# Patient Record
Sex: MALE | Race: WHITE | Employment: OTHER | URBAN - METROPOLITAN AREA
[De-identification: names, ages, dates, MRNs, and addresses within clinical notes are randomized per-mention and may not be internally consistent; named-entity substitution may affect disease eponyms.]

---

## 2017-06-01 ENCOUNTER — HOSPITAL ENCOUNTER (EMERGENCY)
Age: 52
Discharge: HOME | End: 2017-06-02
Attending: EMERGENCY MEDICINE
Payer: OTHER GOVERNMENT

## 2017-06-01 ENCOUNTER — APPOINTMENT (OUTPATIENT)
Dept: GENERAL RADIOLOGY | Age: 52
End: 2017-06-01
Attending: EMERGENCY MEDICINE
Payer: OTHER GOVERNMENT

## 2017-06-01 ENCOUNTER — APPOINTMENT (OUTPATIENT)
Dept: CT IMAGING | Age: 52
End: 2017-06-01
Attending: EMERGENCY MEDICINE
Payer: OTHER GOVERNMENT

## 2017-06-01 VITALS
RESPIRATION RATE: 19 BRPM | BODY MASS INDEX: 39.78 KG/M2 | DIASTOLIC BLOOD PRESSURE: 60 MMHG | OXYGEN SATURATION: 100 % | HEART RATE: 77 BPM | HEIGHT: 74 IN | SYSTOLIC BLOOD PRESSURE: 123 MMHG | WEIGHT: 310 LBS | TEMPERATURE: 98.3 F

## 2017-06-01 DIAGNOSIS — R55 VASOVAGAL REACTION: Primary | ICD-10-CM

## 2017-06-01 LAB
ANION GAP BLD CALC-SCNC: 13 MMOL/L (ref 3–18)
BASOPHILS # BLD AUTO: 0 K/UL (ref 0–0.06)
BASOPHILS # BLD: 0 % (ref 0–2)
BUN SERPL-MCNC: 17 MG/DL (ref 7–18)
BUN/CREAT SERPL: 16 (ref 12–20)
CALCIUM SERPL-MCNC: 8.9 MG/DL (ref 8.5–10.1)
CHLORIDE SERPL-SCNC: 105 MMOL/L (ref 100–108)
CK MB CFR SERPL CALC: NORMAL % (ref 0–4)
CK MB SERPL-MCNC: <1 NG/ML (ref 5–25)
CK SERPL-CCNC: 184 U/L (ref 39–308)
CO2 SERPL-SCNC: 24 MMOL/L (ref 21–32)
CREAT SERPL-MCNC: 1.07 MG/DL (ref 0.6–1.3)
DIFFERENTIAL METHOD BLD: NORMAL
EOSINOPHIL # BLD: 0.3 K/UL (ref 0–0.4)
EOSINOPHIL NFR BLD: 4 % (ref 0–5)
ERYTHROCYTE [DISTWIDTH] IN BLOOD BY AUTOMATED COUNT: 13.7 % (ref 11.6–14.5)
GLUCOSE SERPL-MCNC: 126 MG/DL (ref 74–99)
HCT VFR BLD AUTO: 43.9 % (ref 36–48)
HGB BLD-MCNC: 15.3 G/DL (ref 13–16)
LYMPHOCYTES # BLD AUTO: 36 % (ref 21–52)
LYMPHOCYTES # BLD: 2.7 K/UL (ref 0.9–3.6)
MAGNESIUM SERPL-MCNC: 1.9 MG/DL (ref 1.6–2.6)
MCH RBC QN AUTO: 30.9 PG (ref 24–34)
MCHC RBC AUTO-ENTMCNC: 34.9 G/DL (ref 31–37)
MCV RBC AUTO: 88.7 FL (ref 74–97)
MONOCYTES # BLD: 0.6 K/UL (ref 0.05–1.2)
MONOCYTES NFR BLD AUTO: 8 % (ref 3–10)
NEUTS SEG # BLD: 3.8 K/UL (ref 1.8–8)
NEUTS SEG NFR BLD AUTO: 52 % (ref 40–73)
PLATELET # BLD AUTO: 187 K/UL (ref 135–420)
PMV BLD AUTO: 10.1 FL (ref 9.2–11.8)
POTASSIUM SERPL-SCNC: 4.1 MMOL/L (ref 3.5–5.5)
RBC # BLD AUTO: 4.95 M/UL (ref 4.7–5.5)
SODIUM SERPL-SCNC: 142 MMOL/L (ref 136–145)
TROPONIN I SERPL-MCNC: <0.02 NG/ML (ref 0–0.06)
WBC # BLD AUTO: 7.3 K/UL (ref 4.6–13.2)

## 2017-06-01 PROCEDURE — 71010 XR CHEST PORT: CPT

## 2017-06-01 PROCEDURE — 99285 EMERGENCY DEPT VISIT HI MDM: CPT

## 2017-06-01 PROCEDURE — 83735 ASSAY OF MAGNESIUM: CPT | Performed by: EMERGENCY MEDICINE

## 2017-06-01 PROCEDURE — 85025 COMPLETE CBC W/AUTO DIFF WBC: CPT | Performed by: EMERGENCY MEDICINE

## 2017-06-01 PROCEDURE — 93005 ELECTROCARDIOGRAM TRACING: CPT

## 2017-06-01 PROCEDURE — 70450 CT HEAD/BRAIN W/O DYE: CPT

## 2017-06-01 PROCEDURE — 80048 BASIC METABOLIC PNL TOTAL CA: CPT | Performed by: EMERGENCY MEDICINE

## 2017-06-01 PROCEDURE — 82550 ASSAY OF CK (CPK): CPT | Performed by: EMERGENCY MEDICINE

## 2017-06-01 RX ORDER — BISMUTH SUBSALICYLATE 262 MG
1 TABLET,CHEWABLE ORAL DAILY
COMMUNITY

## 2017-06-01 RX ORDER — LEVOTHYROXINE SODIUM 100 UG/1
TABLET ORAL
COMMUNITY

## 2017-06-01 RX ORDER — CALCIUM CARBONATE/VITAMIN D3 250-3.125
1 TABLET ORAL DAILY
COMMUNITY

## 2017-06-02 LAB
ATRIAL RATE: 76 BPM
CALCULATED P AXIS, ECG09: 37 DEGREES
CALCULATED R AXIS, ECG10: 64 DEGREES
CALCULATED T AXIS, ECG11: 52 DEGREES
DIAGNOSIS, 93000: NORMAL
P-R INTERVAL, ECG05: 150 MS
Q-T INTERVAL, ECG07: 382 MS
QRS DURATION, ECG06: 92 MS
QTC CALCULATION (BEZET), ECG08: 429 MS
VENTRICULAR RATE, ECG03: 76 BPM

## 2017-06-02 NOTE — DISCHARGE INSTRUCTIONS
Vasovagal Syncope: Care Instructions  Your Care Instructions    Vasovagal syncope (say \"uvc-mcs-BSRKika SYLVESTER-kuh-pee\") is sudden dizziness or fainting that can be set off by things such as pain, stress, fear, or trauma. You may sweat or feel lightheaded, sick to your stomach, or tingly. The problem causes the heart rate to slow and the blood vessels to widen, or dilate, for a short time. When this happens, blood pools in the lower body, and less blood goes to the brain. You can usually get relief by lying down with your legs raised (elevated). This helps more blood to flow to your brain and may help relieve symptoms like feeling dizzy. Some doctors may recommend a technique that involves tensing your fists and arms. This type of fainting is often easy to predict. For example, it happens to some people when they see blood or have to get a shot. They may feel symptoms before they faint. An episode of vasovagal syncope usually responds well to self-care. Other treatment often isn't needed. But if the fainting keeps happening, your doctor may suggest further treatments. Follow-up care is a key part of your treatment and safety. Be sure to make and go to all appointments, and call your doctor if you are having problems. It's also a good idea to know your test results and keep a list of the medicines you take. How can you care for yourself at home? · Drink plenty of fluids to prevent dehydration. If you have kidney, heart, or liver disease and have to limit fluids, talk with your doctor before you increase your fluid intake. · Try to avoid things that you think may set off vasovagal syncope. · Talk to your doctor about any medicines you take. Some medicines may increase the chance of this condition occurring. · If you feel symptoms, lie down with your legs raised. Talk to your doctor about what to do if your symptoms come back. When should you call for help?   Call 911 anytime you think you may need emergency care. For example, call if:  · You have symptoms of a heart problem. These may include:  ¨ Chest pain or pressure. ¨ Severe trouble breathing. ¨ A fast or irregular heartbeat. Watch closely for changes in your health, and be sure to contact your doctor if:  · You have more episodes of fainting at home. · You do not get better as expected. Where can you learn more? Go to http://amador-prieto.info/. Enter L754 in the search box to learn more about \"Vasovagal Syncope: Care Instructions. \"  Current as of: May 27, 2016  Content Version: 11.2  © 0625-3223 [employer]. Care instructions adapted under license by [employer] (which disclaims liability or warranty for this information). If you have questions about a medical condition or this instruction, always ask your healthcare professional. [address] any warranty or liability for your use of this information.

## 2017-06-02 NOTE — ED TRIAGE NOTES
Pt experienced 30 second LOC while eating dinner. Reported to family that he felt \"dizzy\" then \"looked like he was choking\" per wife. Regained consciousness after 30 seconds without any symptoms. Pt reports he had gastric sleeve in April (24th) and that he was cleared for physical activity in May. Has been doing physical activity today, has been moving household goods. Sepsis Screening completed    (  )Patient meets SIRS criteria. (  X)Patient does not meet SIRS criteria.       SIRS Criteria is achieved when two or more of the following are present   Temperature < 96.8°F (36°C) or > 100.9°F (38.3°C)   Heart Rate > 90 beats per minute   Respiratory Rate > 20 breaths per minute   WBC count > 12,000 or <4,000 or > 10% bands

## 2017-06-02 NOTE — ED PROVIDER NOTES
HPI Comments:   9:58 PM  46 y.o. male presents to ED via EMS c/o syncopal episode onset just PTA. Associated symptoms include nausea, diaphoresis, drooling, and pallor (all now resolved). Reports he was having dinner at a [employer] having eaten rockfish for the first time when he began to feel \"indegestion pain,\" states he put his head down, and next thing he remembers is being surrounded by people lowering him down to the ground. Wife states pt had put his head down, and when he lifted it back up, he began to slump to the side before people caught him to lower him to the ground. Per wife, pt was not blinking and his eye did not roll back, but his head was shaking, but no body shaking. Wife also states that when pt awoke he was able to speak, and did not seem confused. PSHx of gastric bypass on 4/14/2017 by Dr. Chaim Ruiz (general surgery). Pt denies HA, dizziness, chest pain, SOB, lightheadedness, hx seizures and any other Sx or complaints. Patient is a 46 y.o. male presenting with syncope. The history is provided by the patient and the spouse. No  was used. Syncope    This is a new problem. The current episode started less than 1 hour ago. The problem has been resolved. The problem is associated with normal activity. Associated symptoms include diaphoresis, abdominal pain (\"indigestion pain\") and nausea. Pertinent negatives include no chest pain, no confusion, no vomiting, no congestion, no headaches, no dizziness, no light-headedness and no weakness. His past medical history is significant for syncope. His past medical history does not include no seizures.         Past Medical History:   Diagnosis Date    Anxiety 7/2/2010    Cancer ([address] 10/11/13    hodgkins lymphoma    Depression 7/2/2010    [facility] [address] [address]     Insomnia     Migraine     Obesity     Sleep apnea     Tinea pedis        Past Surgical History:   Procedure Laterality Date    CHEST SURGERY PROCEDURE UNLISTED 9/26/13    medial stenum mass removed    HX GI      gastric sleeve 4/24/17         Family History:   Problem Relation Age of Onset    Cancer Mother     Breast Cancer Mother     Alzheimer Paternal Grandmother     Stroke Paternal Grandfather     Diabetes Paternal Grandfather     Alzheimer Paternal Grandfather        Social History     Social History    Marital status:      Spouse name: N/A    Number of children: N/A    Years of education: N/A     Occupational History    Not on file. Social History Main Topics    Smoking status: Former Smoker     Quit date: 7/2/1989    Smokeless tobacco: Not on file    Alcohol use Yes      Comment: occassional    Drug use: No    Sexual activity: No     Other Topics Concern    Not on file     Social History Narrative         ALLERGIES: Neurontin [gabapentin] and Valtrex [valacyclovir]    Review of Systems   Constitutional: Positive for diaphoresis. HENT: Negative for congestion and trouble swallowing. Eyes: Negative for photophobia and visual disturbance. Respiratory: Negative for shortness of breath. Cardiovascular: Positive for syncope. Negative for chest pain. Gastrointestinal: Positive for abdominal pain (\"indigestion pain\") and nausea. Negative for diarrhea and vomiting. Genitourinary: Negative for difficulty urinating. Musculoskeletal: Negative. Skin: Positive for pallor. Neurological: Positive for syncope. Negative for dizziness, speech difficulty, weakness, light-headedness and headaches. Psychiatric/Behavioral: Negative for confusion. All other systems reviewed and are negative. Vitals:    06/01/17 2154 06/01/17 2310 06/01/17 2320 06/01/17 2325   BP: 126/71 131/57 123/69 123/60   Pulse: 83 78 77 77   Resp: 18 21 20 19   Temp: 98.3 °F (36.8 °C)      SpO2: 100% 100%  100%   Weight: 140.6 kg (310 lb)      Height: 6' 2\" (1.88 m)               Physical Exam   Constitutional: He is oriented to person, place, and time.  He appears well-developed and well-nourished. No distress. HENT:   Head: Normocephalic and atraumatic. Right Ear: External ear normal.   Left Ear: External ear normal.   Nose: Nose normal.   Mouth/Throat: Oropharynx is clear and moist.   Eyes: Conjunctivae are normal. Pupils are equal, round, and reactive to light. No scleral icterus. Neck: Normal range of motion. Neck supple. Cardiovascular: Intact distal pulses. Capillary refill < 3 seconds   Pulmonary/Chest: Effort normal and breath sounds normal. No respiratory distress. He has no wheezes. Abdominal: Soft. Bowel sounds are normal. He exhibits no distension and no mass. There is no tenderness. There is no rebound and no guarding. 5 laparoscopic scars, clean, no signs of infection. Musculoskeletal: Normal range of motion. He exhibits no edema or tenderness. Lymphadenopathy:     He has no cervical adenopathy. Neurological: He is alert and oriented to person, place, and time. No cranial nerve deficit. No cerebellar ataxia on finger to nose test  Sensation intact  Strength 5/5 B/L ue and le  EOMI  No slurred speech   Skin: Skin is warm and dry. He is not diaphoretic. Psychiatric: His behavior is normal.   Nursing note and vitals reviewed. RESULTS:    CARDIAC MONITOR NOTE:  Cardiac Rhythm: NSR  Rate: 87 bpm     PULSE OXIMETRY NOTE:  Pulse-ox is 100% on room air  Interpretation: normal       EKG interpretation: (Preliminary)  NSR. Rate 76 bpm. Normal QRS duration. Normal axis. No ST elevation. No T wave inversions. EKG read by Huma Dee DO at 21:51     [address] [place] [address]  Chest X-ray shows NAP  Pending review by Radiologist  Recorded by PHOENIX Lazo, as dictated by Huma Dee DO    CT HEAD WO CONT   Final Result  IMPRESSION:     No acute intracranial abnormalities.     As read by the radiologist.       XR CHEST PORT    (Results Pending)        Labs Reviewed   METABOLIC PANEL, BASIC - Abnormal; Notable for the following: Result Value    Glucose 126 (*)     All other components within normal limits   CBC WITH AUTOMATED DIFF   CARDIAC PANEL,(CK, CKMB & TROPONIN)   MAGNESIUM       Recent Results (from the past 12 hour(s))   EKG, 12 LEAD, INITIAL    Collection Time: 06/01/17  9:51 PM   Result Value Ref Range    Ventricular Rate 76 BPM    Atrial Rate 76 BPM    P-R Interval 150 ms    QRS Duration 92 ms    Q-T Interval 382 ms    QTC Calculation (Bezet) 429 ms    Calculated P Axis 37 degrees    Calculated R Axis 64 degrees    Calculated T Axis 52 degrees    Diagnosis       Normal sinus rhythm  Normal ECG  No previous ECGs available     CBC WITH AUTOMATED DIFF    Collection Time: 06/01/17  9:55 PM   Result Value Ref Range    WBC 7.3 4.6 - 13.2 K/uL    RBC 4.95 4.70 - 5.50 M/uL    HGB 15.3 13.0 - 16.0 g/dL    HCT 43.9 36.0 - 48.0 %    MCV 88.7 74.0 - 97.0 FL    MCH 30.9 24.0 - 34.0 PG    MCHC 34.9 31.0 - 37.0 g/dL    RDW 13.7 11.6 - 14.5 %    PLATELET 888 722 - 870 K/uL    MPV 10.1 9.2 - 11.8 FL    NEUTROPHILS 52 40 - 73 %    LYMPHOCYTES 36 21 - 52 %    MONOCYTES 8 3 - 10 %    EOSINOPHILS 4 0 - 5 %    BASOPHILS 0 0 - 2 %    ABS. NEUTROPHILS 3.8 1.8 - 8.0 K/UL    ABS. LYMPHOCYTES 2.7 0.9 - 3.6 K/UL    ABS. MONOCYTES 0.6 0.05 - 1.2 K/UL    ABS. EOSINOPHILS 0.3 0.0 - 0.4 K/UL    ABS.  BASOPHILS 0.0 0.0 - 0.06 K/UL    DF AUTOMATED     METABOLIC PANEL, BASIC    Collection Time: 06/01/17  9:55 PM   Result Value Ref Range    Sodium 142 136 - 145 mmol/L    Potassium 4.1 3.5 - 5.5 mmol/L    Chloride 105 100 - 108 mmol/L    CO2 24 21 - 32 mmol/L    Anion gap 13 3.0 - 18 mmol/L    Glucose 126 (H) 74 - 99 mg/dL    BUN 17 7.0 - 18 MG/DL    Creatinine 1.07 0.6 - 1.3 MG/DL    BUN/Creatinine ratio 16 12 - 20      GFR est AA >60 >60 ml/min/1.73m2    GFR est non-AA >60 >60 ml/min/1.73m2    Calcium 8.9 8.5 - 10.1 MG/DL   CARDIAC PANEL,(CK, CKMB & TROPONIN)    Collection Time: 06/01/17  9:55 PM   Result Value Ref Range     39 - 308 U/L    CK - MB <1.0 <3.6 ng/ml    CK-MB Index Cannot be calulated 0.0 - 4.0 %    Troponin-I, Qt. <0.02 0.00 - 0.06 NG/ML   MAGNESIUM    Collection Time: 06/01/17  9:55 PM   Result Value Ref Range    Magnesium 1.9 1.6 - 2.6 mg/dL       MDM  Number of Diagnoses or Management Options  Vasovagal reaction:   Diagnosis management comments: DDx: vasovagal syncope, orthostatics, seizure, hypotension, choking, metabolic. AFVSS  Labs, CT head  No alarming labs. Pt states he is ready to go home, feeling much better. Consistent with vasovagal reaction  Pt states he has     Will have pt fu  pcp and also call  for neurologist fu. I have reassessed the patient. I have discussed the workup, results and plan with the patient and patient is in agreement. Patient is feeling better. Patient was discharge in stable condition. Patient was given outpatient follow up. Patient is to return to emergency department if any new or worsening condition. Amount and/or Complexity of Data Reviewed  Clinical lab tests: ordered and reviewed  Tests in the radiology section of CPT®: ordered and reviewed (CXR, CT Head)  Tests in the medicine section of CPT®: reviewed and ordered (EKG)  Review and summarize past medical records: yes  Discuss the patient with other providers: yes Cynthia Frost MD (neurology))  Independent visualization of images, tracings, or specimens: yes (EKG, CXR)    Risk of Complications, Morbidity, and/or Mortality  Presenting problems: moderate  Diagnostic procedures: moderate  Management options: moderate    Patient Progress  Patient progress: improved    ED Course   Medications - No data to display     Procedures    PROGRESS NOTE:  9:58 PM  Initial assessment performed.   Written by Doroteo Taylor ED Scribe, as dictated by Arya Siddiqui DO     CONSULT NOTE:   12:48 AM  Arya Siddiqui DO spoke with Cynthia Frost MD   Specialty: Neurology  Discussed pt's hx, disposition, and available diagnostic and imaging results over the telephone. Consulting physician agrees likely vasovagal episode. Will see the pt as an outpatient f/u. .   Written by Adonis Leyden, ED Scribe, as dictated by Corky Pina DO.      DISCHARGE NOTE:  12:52 AM  Ofe Shrestha  results have been reviewed with him. He has been counseled regarding his diagnosis, treatment, and plan. He verbally conveys understanding and agreement of the signs, symptoms, diagnosis, treatment and prognosis and additionally agrees to follow up as discussed. He also agrees with the care-plan and conveys that all of his questions have been answered. I have also provided discharge instructions for him that include: educational information regarding their diagnosis and treatment, and list of reasons why they would want to return to the ED prior to their follow-up appointment, should his condition change. Proper ED utilization discussed with the pt. CLINICAL IMPRESSION:    1. Vasovagal reaction        PLAN: DISCHARGE HOME    Follow-up Information     Follow up With Details Comments Contact Info    Tia Mercer MD Go to For follow up with neurology as an outpatient [address] [place] [address] [address] [place] [address] [address]      [facility] EMERGENCY DEPT Go to As needed, If symptoms worsen [address] [place]  [phone]          Current Discharge Medication List          ATTESTATIONS:  This note is prepared by Adonis Leyden, acting as Scribe for Corky Pina DO. Corky Pina DO: The scribe's documentation has been prepared under my direction and personally reviewed by me in its entirety. I confirm that the note above accurately reflects all work, treatment, procedures, and medical decision making performed by me.

## 2017-06-02 NOTE — ED NOTES
Pt updated on plan of care waiting on test results. Pt verbalized understanding. Pt remains on cardiac monitor and vital signs remains stable. Bed in lowest locked position, side rails up x 2 and call bell in reach of patient and patient and instructed to use call bell for any assistance needed.

## 2017-09-05 NOTE — ED NOTES
Pt comes in tonight via EMS after syncopal episode at while at dinner tonight. Pt states he has been packing and moving all day and \"probably didn't stay hydrated enough. \" Pt states right before episode he felt nausea and diaphoretic. Pt states he laid his head down on the table and the next thing he remembers was waking up on the floor \"surrounded by people. \" Per patients wife, patient never hit his head or fell, he was laid down by his wife and family. Yes, Non-Core measure site...